# Patient Record
Sex: MALE | Race: WHITE | Employment: FULL TIME | URBAN - METROPOLITAN AREA
[De-identification: names, ages, dates, MRNs, and addresses within clinical notes are randomized per-mention and may not be internally consistent; named-entity substitution may affect disease eponyms.]

---

## 2021-05-03 ENCOUNTER — OFFICE VISIT (OUTPATIENT)
Dept: ENT CLINIC | Age: 26
End: 2021-05-03
Payer: COMMERCIAL

## 2021-05-03 VITALS
DIASTOLIC BLOOD PRESSURE: 81 MMHG | OXYGEN SATURATION: 97 % | SYSTOLIC BLOOD PRESSURE: 115 MMHG | HEART RATE: 75 BPM | WEIGHT: 252 LBS

## 2021-05-03 DIAGNOSIS — H60.332 ACUTE SWIMMER'S EAR OF LEFT SIDE: Primary | ICD-10-CM

## 2021-05-03 DIAGNOSIS — H60.42 CHOLESTEATOMA OF EXTERNAL AUDITORY CANAL, LEFT: ICD-10-CM

## 2021-05-03 DIAGNOSIS — H61.22 IMPACTED CERUMEN OF LEFT EAR: ICD-10-CM

## 2021-05-03 PROCEDURE — 99203 OFFICE O/P NEW LOW 30 MIN: CPT | Performed by: OTOLARYNGOLOGY

## 2021-05-03 RX ORDER — CIPROFLOXACIN AND DEXAMETHASONE 3; 1 MG/ML; MG/ML
4 SUSPENSION/ DROPS AURICULAR (OTIC) 2 TIMES DAILY
Qty: 1 BOTTLE | Refills: 0 | Status: SHIPPED | OUTPATIENT
Start: 2021-05-03

## 2021-05-03 ASSESSMENT — ENCOUNTER SYMPTOMS
RHINORRHEA: 0
SORE THROAT: 0
SINUS PAIN: 0

## 2021-05-03 NOTE — PATIENT INSTRUCTIONS
NO Q-TIPS OR OTHER INSTRUMENTS/OBJECTS IN THE EARS   You should never clean your ears with a Q-tip, cotton tipped applicator, Parvin pin, paper clip, pen cap, nail file, Bull head screwdriver, or any other instrument. This will tend to push wax in deeper and pack the ear canal with wax. There is a high risk and danger of this practice, especially rupture of ear drum, dislocation or other damage to ossicles, and permanent, irreversible, and irreparable hearing loss and/or dizziness. I recommend only use of one the several ear wax removal kits available \"over the counter\" if you feel a need to try to remove ear wax. For example, Murine, Bausch and Lomb, NeilMed, or Debrox ear wax removal kits may be used for ear wax removal, as needed. No other methods should be self used for cleaning your ears.
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
## 2021-06-03 ENCOUNTER — OFFICE VISIT (OUTPATIENT)
Dept: ENT CLINIC | Age: 26
End: 2021-06-03
Payer: COMMERCIAL

## 2021-06-03 ENCOUNTER — PROCEDURE VISIT (OUTPATIENT)
Dept: AUDIOLOGY | Age: 26
End: 2021-06-03
Payer: COMMERCIAL

## 2021-06-03 VITALS
DIASTOLIC BLOOD PRESSURE: 79 MMHG | SYSTOLIC BLOOD PRESSURE: 126 MMHG | HEART RATE: 75 BPM | WEIGHT: 247 LBS | TEMPERATURE: 98.5 F

## 2021-06-03 DIAGNOSIS — H71.22 CHOLESTEATOMA OF LEFT MIDDLE EAR AND MASTOID: Primary | ICD-10-CM

## 2021-06-03 DIAGNOSIS — H90.72 MIXED CONDUCTIVE AND SENSORINEURAL HEARING LOSS OF LEFT EAR WITH UNRESTRICTED HEARING OF RIGHT EAR: Primary | ICD-10-CM

## 2021-06-03 DIAGNOSIS — H91.92 HEARING LOSS OF LEFT EAR, UNSPECIFIED HEARING LOSS TYPE: ICD-10-CM

## 2021-06-03 PROCEDURE — 92567 TYMPANOMETRY: CPT | Performed by: AUDIOLOGIST

## 2021-06-03 PROCEDURE — 99213 OFFICE O/P EST LOW 20 MIN: CPT | Performed by: OTOLARYNGOLOGY

## 2021-06-03 PROCEDURE — 92557 COMPREHENSIVE HEARING TEST: CPT | Performed by: AUDIOLOGIST

## 2021-06-03 ASSESSMENT — ENCOUNTER SYMPTOMS
RHINORRHEA: 0
SORE THROAT: 0

## 2021-06-03 NOTE — PROGRESS NOTES
[facility]    6/3/2021     PCP: No primary care provider on file. MRN: <[id number]>     AUDIOLOGIC AND OTHER PERTINENT MEDICAL HISTORY:        Helen Lopez was seen for hearing and middle ear evaluation. Otolaryngology is referral source of today's appointment. Patient presents with decreased left ear hearing ability. AUDIOGRAM/IMMITTANCE (MIDDLE EAR FUNCTION):      Audiogram demonstrates normal hearing of right ear. Moderate conductive loss that rises to mild degree, of left ear. Loss is asymmetrical, greater on left. Speech discrimination ability was excellent in quiet, at elevated levels. Immittance consistent with normal middle ear function (Type A curve) of left ear, mild negative middle ear pressure (Type C curve) of right. Ipsilateral acoustic reflexes present from right ear,  absent from left. Chart cc'd to: Carlos Manuel Francis MD    COUNSELING:          Audiogram results were reviewed with patient.             RECOMMENDATIONS:         ENT to medically advise    Electronically signed by Derek Crisostomo on 6/3/21 at 11:55 AM.
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
## 2021-06-03 NOTE — PATIENT INSTRUCTIONS
Continue ear drops for 7 days. WATER PRECAUTIONS  · Do not allow water to get into your left ear, as this may cause, or worsen, an ear infection. · Before bathing, showering or washing your hair, a plug of cotton coated with petroleum jelly should be placed in the opening of your ear canal.  After bathing the cotton should be removed and the outer part of your ear dried with a soft towel. Alternatively, you may use a silicone putty ear plug purchased from your pharmacy. · If water does enter your ear, drain the water out into a tissue or cotton ball. Call the office if you develop develops pain or drainage.

## 2021-06-13 ENCOUNTER — HOSPITAL ENCOUNTER (OUTPATIENT)
Dept: CT IMAGING | Age: 26
Discharge: HOME | End: 2021-06-13
Payer: COMMERCIAL

## 2021-06-13 DIAGNOSIS — H71.22 CHOLESTEATOMA OF LEFT MIDDLE EAR AND MASTOID: ICD-10-CM

## 2021-06-13 PROCEDURE — 70480 CT ORBIT/EAR/FOSSA W/O DYE: CPT

## 2021-06-28 ENCOUNTER — OFFICE VISIT (OUTPATIENT)
Dept: ENT CLINIC | Age: 26
End: 2021-06-28
Payer: COMMERCIAL

## 2021-06-28 VITALS — SYSTOLIC BLOOD PRESSURE: 121 MMHG | DIASTOLIC BLOOD PRESSURE: 78 MMHG | HEART RATE: 73 BPM

## 2021-06-28 DIAGNOSIS — H71.22 CHOLESTEATOMA OF LEFT MIDDLE EAR AND MASTOID: Primary | ICD-10-CM

## 2021-06-28 DIAGNOSIS — H90.12 CONDUCTIVE HEARING LOSS OF LEFT EAR WITH UNRESTRICTED HEARING OF RIGHT EAR: ICD-10-CM

## 2021-06-28 PROCEDURE — 99214 OFFICE O/P EST MOD 30 MIN: CPT | Performed by: OTOLARYNGOLOGY

## 2021-06-28 NOTE — PROGRESS NOTES
[facility]       PCP:  Sherry Melo MD      CHIEF COMPLAINT  Chief Complaint   Patient presents with    Ear Problem     colesteatoma left ear. HISTORY OF PRESENT ILLNESS       Kristin Gamble is a 27 y.o. male here for recheck and follow up of left middle ear and mastoid cholesteatoma. REVIEW OF SYSTEMS   Review of Systems   Constitutional: Negative for chills and fever. HENT: Negative for ear discharge, ear pain, rhinorrhea, sinus pain and sore throat. PAST MEDICAL HISTORY    No past medical history on file. No past surgical history on file. EXAMINATION      Vitals:    06/28/21 0907   BP: 121/78   Site: Right Upper Arm   Position: Sitting   Cuff Size: Medium Adult   Pulse: 73         Physical Exam  Vitals reviewed. Constitutional:       General: He is not in acute distress. Appearance: Normal appearance. He is not ill-appearing. HENT:      Head: Normocephalic. Ears:      Comments: Bilateral otomicroscopy performed. There was a posterior superior quadrant perforation filled with squamous debris consistent with cholesteatoma in the posterior superior mesotympanum and extending toward the mastoid. Neurological:      Mental Status: He is alert. REVIEW OF IMAGES         I independently reviewed the images of the CT scan of the IACs. Narrative   EXAMINATION:   CT OF THE INTERNAL AUDITORY CANAL WITHOUT CONTRAST 6/13/2021 3:03 pm:     FINDINGS:   RIGHT TEMPORAL BONE:  The external auditory canal is clear without evidence   of bony erosion.  The scutum is intact.  The middle ear cavity is clear.  The   ossicular chain is intact.  The mastoid air cells are clear.  The inner ear   structures appear unremarkable.  Normal mineralization of the otic capsule.    The internal auditory canal and vestibular aqueduct appear unremarkable.  The   carotid canal is normal in appearance.  The jugular bulb is unremarkable.       LEFT TEMPORAL BONE: The external auditory canal is clear without evidence of   bony erosion.  Moderate opacification of the left mastoid air cells and left   middle ear cavity is associated with osseous erosion of the scrotum and part   of incus. Findings are consistent with left-sided otomastoiditis with   superimposed cholesteatoma.  The inner ear structures appear unremarkable. Normal mineralization of the otic capsule.  The internal auditory canal and   vestibular aqueduct appear unremarkable.  The carotid canal is normal in   appearance.  The jugular bulb is unremarkable.       BRAIN: The visualized portion of the intracranial contents appear   unremarkable.       ORBITS: The visualized portion of the orbits demonstrate no acute abnormality.       SINUSES: Mild mucosal thickening of the ethmoidal air cells and right   sphenoid sinus and maxillary sinuses. The visualized paranasal sinuses are   otherwise clear.           Impression   Moderate opacification of the left mastoid air cells and left middle ear   cavity is associated with osseous erosion of the scrotum and part of incus. Findings are consistent with left-sided otomastoiditis with superimposed   cholesteatoma.       Unremarkable right temporal bone study.       Mild mucosal thickening of the ethmoidal air cells, right sphenoid sinus and   maxillary sinuses.             AUDIOGRAM (6/03/2021):  Left moderate unilateral pan frequency conductive hearing loss. COUNSELING FOR TYMPANOPLASTY WITH MASTOIDECTOMY, POSSIBLE OSSICULAR RECONSTRUCTION:  Shobha Rubio was advised of the recommended surgery/procedure of tympanoplasty with mastoidectomy, AND possible ossicular reconstruction. Shobha Rubio stated that he wants to proceed with surgery. Shobha Rubio stated understanding that this surgery will be done under general anesthesia. The surgical procedure was described.   I discussed the incision and resultant scar and possible resultant deformity. I discussed the surgical technique and the usual post operative course. I discussed the possibility of persistent or recurrent cholesteatoma, tympanic membrane perforation and/or hearing loss, which could be worse after surgery and removal of cholesteatoma. I advised there is no guarantee of cure, success, or of final results. I discussed the alternatives of therapy, expected outcome and potential benefits, and the attendant risks and potential complications, including, but not limited to, failure of the eardrum to heal or the perforation to close with resultant permanent perforation, persistent hearing loss, persistent or recurrent cholesteatoma, excessive intraoperative or postoperative bleeding, hemorrhage, infection, facial paralysis, injury to surrounding structures or organs, injury to major blood vessels or nerves, excessive scarring, deformity, poor (incomplete or lack of) wound healing, pain, discomfort, loss of taste on the anterior two thirds of tongue, persistent hearing loss, total loss of hearing, chronic or permanent dizziness and balance problems, worsening of hearing, numbness of ear and surrounding skin, need for further surgery, and risks of anesthesia, including heart attack, cardiac arrest, stroke, blood clots in lower extremity veins, pulmonary embolism, and death, and other risks listed on the informed consent document, which we discussed together. I discussed cholesteatoma, natural history of the disease, signs, symptoms, possible progression with further loss of hearing, dizziness, facial nerve paralysis and the destructive nature with bone erosion and possible intracranial extension disease. I discussed the surgical treatment of cholesteatoma and need for removal and/or exteriorization of the disease. All of Roge's questions were answered.   Jesus Gibson then stated and confirmed understanding and acceptance of the preceding counseling, the lack of further questions, and the desire to proceed with surgery. Then, Rochelle Penny read and signed the informed consent document. Main Poon / Maria Victoria Mason / Cecile Rivers was seen today for ear problem. Diagnoses and all orders for this visit:    Cholesteatoma of left middle ear and mastoid    Conductive hearing loss of left ear with unrestricted hearing of right ear         RECOMMENDATIONS/PLAN      1. Left tympanoplasty with mastoidectomy, possible ossicular resconstruction. 2. Return for post op check. MEDICAL DECISION MAKING  # and complexity of problems addressed:  66960 - Moderate  1 or more chronic illness with exacerbation, progression or side effect of tx (chronic suppurative otitis media with cholesteatoma with progression)     Amount and/or Complexity of Data to be Reviewed and Analyzed  45074 - Straightforward  no data or only 1 test or document reviewed or ordered    Risk of Complications and /or Morbidity or Mortality of Patient Management   05771 - High  Decision regarding elective major surgery with identified patient or procedure risk factors.

## 2021-07-02 ENCOUNTER — TELEPHONE (OUTPATIENT)
Dept: ENT CLINIC | Age: 26
End: 2021-07-02

## 2021-07-02 NOTE — TELEPHONE ENCOUNTER
Patient returned call and I informed him that the estimate that was ran for him would be $6459.84. Patient stated he is going for a second opinion and would like to cancel his surgery as of now.

## 2021-07-02 NOTE — TELEPHONE ENCOUNTER
Called patient to inform of price of surgery. No answer. Left message for patient to call office back to discuss it.

## 2021-07-03 ASSESSMENT — ENCOUNTER SYMPTOMS
SORE THROAT: 0
RHINORRHEA: 0
SINUS PAIN: 0

## 2024-03-11 ENCOUNTER — OFFICE VISIT (OUTPATIENT)
Dept: PRIMARY CARE CLINIC | Age: 29
End: 2024-03-11
Payer: COMMERCIAL

## 2024-03-11 VITALS
WEIGHT: 242 LBS | SYSTOLIC BLOOD PRESSURE: 104 MMHG | BODY MASS INDEX: 32.78 KG/M2 | HEIGHT: 72 IN | TEMPERATURE: 97.3 F | OXYGEN SATURATION: 97 % | DIASTOLIC BLOOD PRESSURE: 68 MMHG | HEART RATE: 52 BPM | RESPIRATION RATE: 14 BRPM

## 2024-03-11 DIAGNOSIS — R73.09 IMPAIRED GLUCOSE REGULATION: ICD-10-CM

## 2024-03-11 DIAGNOSIS — Z00.00 ENCOUNTER FOR WELL ADULT EXAM WITHOUT ABNORMAL FINDINGS: ICD-10-CM

## 2024-03-11 DIAGNOSIS — R79.0 LOW MAGNESIUM LEVEL: ICD-10-CM

## 2024-03-11 DIAGNOSIS — Z11.59 ENCOUNTER FOR HEPATITIS C SCREENING TEST FOR LOW RISK PATIENT: ICD-10-CM

## 2024-03-11 DIAGNOSIS — Z13.220 SCREENING CHOLESTEROL LEVEL: ICD-10-CM

## 2024-03-11 DIAGNOSIS — K90.41 GLUTEN-SENSITIVE ENTEROPATHY: ICD-10-CM

## 2024-03-11 DIAGNOSIS — R53.83 OTHER FATIGUE: ICD-10-CM

## 2024-03-11 DIAGNOSIS — Z23 NEED FOR VARICELLA VACCINE: ICD-10-CM

## 2024-03-11 DIAGNOSIS — Z11.4 ENCOUNTER FOR SCREENING FOR HIV: ICD-10-CM

## 2024-03-11 DIAGNOSIS — Z00.00 ENCOUNTER FOR WELL ADULT EXAM WITHOUT ABNORMAL FINDINGS: Primary | ICD-10-CM

## 2024-03-11 DIAGNOSIS — R10.13 EPIGASTRIC DISCOMFORT: ICD-10-CM

## 2024-03-11 DIAGNOSIS — E55.9 VITAMIN D DEFICIENCY: ICD-10-CM

## 2024-03-11 DIAGNOSIS — F41.1 GENERALIZED ANXIETY DISORDER: ICD-10-CM

## 2024-03-11 DIAGNOSIS — Z71.89 ACP (ADVANCE CARE PLANNING): ICD-10-CM

## 2024-03-11 LAB
BASOPHILS # BLD: 0 K/UL (ref 0–0.2)
BASOPHILS NFR BLD: 0.5 %
DEPRECATED RDW RBC AUTO: 12.9 % (ref 12.4–15.4)
EOSINOPHIL # BLD: 0.1 K/UL (ref 0–0.6)
EOSINOPHIL NFR BLD: 1.1 %
HCT VFR BLD AUTO: 45.3 % (ref 40.5–52.5)
HGB BLD-MCNC: 16.1 G/DL (ref 13.5–17.5)
LYMPHOCYTES # BLD: 2.2 K/UL (ref 1–5.1)
LYMPHOCYTES NFR BLD: 28 %
MCH RBC QN AUTO: 30.4 PG (ref 26–34)
MCHC RBC AUTO-ENTMCNC: 35.4 G/DL (ref 31–36)
MCV RBC AUTO: 85.9 FL (ref 80–100)
MONOCYTES # BLD: 0.4 K/UL (ref 0–1.3)
MONOCYTES NFR BLD: 4.8 %
NEUTROPHILS # BLD: 5.2 K/UL (ref 1.7–7.7)
NEUTROPHILS NFR BLD: 65.6 %
PLATELET # BLD AUTO: 326 K/UL (ref 135–450)
PMV BLD AUTO: 8 FL (ref 5–10.5)
RBC # BLD AUTO: 5.28 M/UL (ref 4.2–5.9)
WBC # BLD AUTO: 8 K/UL (ref 4–11)

## 2024-03-11 PROCEDURE — 99385 PREV VISIT NEW AGE 18-39: CPT | Performed by: INTERNAL MEDICINE

## 2024-03-11 SDOH — ECONOMIC STABILITY: FOOD INSECURITY: WITHIN THE PAST 12 MONTHS, THE FOOD YOU BOUGHT JUST DIDN'T LAST AND YOU DIDN'T HAVE MONEY TO GET MORE.: NEVER TRUE

## 2024-03-11 SDOH — ECONOMIC STABILITY: INCOME INSECURITY: HOW HARD IS IT FOR YOU TO PAY FOR THE VERY BASICS LIKE FOOD, HOUSING, MEDICAL CARE, AND HEATING?: NOT HARD AT ALL

## 2024-03-11 SDOH — ECONOMIC STABILITY: FOOD INSECURITY: WITHIN THE PAST 12 MONTHS, YOU WORRIED THAT YOUR FOOD WOULD RUN OUT BEFORE YOU GOT MONEY TO BUY MORE.: NEVER TRUE

## 2024-03-11 SDOH — ECONOMIC STABILITY: HOUSING INSECURITY
IN THE LAST 12 MONTHS, WAS THERE A TIME WHEN YOU DID NOT HAVE A STEADY PLACE TO SLEEP OR SLEPT IN A SHELTER (INCLUDING NOW)?: NO

## 2024-03-11 ASSESSMENT — PATIENT HEALTH QUESTIONNAIRE - PHQ9
SUM OF ALL RESPONSES TO PHQ QUESTIONS 1-9: 2
2. FEELING DOWN, DEPRESSED OR HOPELESS: 1
1. LITTLE INTEREST OR PLEASURE IN DOING THINGS: 1
SUM OF ALL RESPONSES TO PHQ QUESTIONS 1-9: 2
SUM OF ALL RESPONSES TO PHQ9 QUESTIONS 1 & 2: 2
SUM OF ALL RESPONSES TO PHQ QUESTIONS 1-9: 2
SUM OF ALL RESPONSES TO PHQ QUESTIONS 1-9: 2

## 2024-03-11 ASSESSMENT — ENCOUNTER SYMPTOMS
NAUSEA: 0
VOMITING: 0
BLOOD IN STOOL: 0
COLOR CHANGE: 0
TROUBLE SWALLOWING: 0
SINUS PRESSURE: 0
SHORTNESS OF BREATH: 0
SORE THROAT: 0
WHEEZING: 0
ABDOMINAL PAIN: 1
CONSTIPATION: 0
DIARRHEA: 0
EYE REDNESS: 0
ABDOMINAL DISTENTION: 0
EYE PAIN: 0
CHEST TIGHTNESS: 0
BACK PAIN: 0
COUGH: 0

## 2024-03-11 NOTE — PATIENT INSTRUCTIONS
following the DASH diet can lower blood pressure even further than just the DASH diet alone.  Follow-up care is a key part of your treatment and safety. Be sure to make and go to all appointments, and call your doctor if you are having problems. It’s also a good idea to know your test results and keep a list of the medicines you take.  How can you care for yourself at home?  Following the DASH diet  Eat 4 to 5 servings of fruit each day. A serving is 1 medium-sized piece of fruit, ½ cup chopped or canned fruit, 1/4 cup dried fruit, or 4 ounces (½ cup) of fruit juice. Choose fruit more often than fruit juice.  Eat 4 to 5 servings of vegetables each day. A serving is 1 cup of lettuce or raw leafy vegetables, ½ cup of chopped or cooked vegetables, or 4 ounces (½ cup) of vegetable juice. Choose vegetables more often than vegetable juice.  Get 2 to 3 servings of low-fat and fat-free dairy each day. A serving is 8 ounces of milk, 1 cup of yogurt, or 1 ½ ounces of cheese.  Eat 7 to 8 servings of grains each day. A serving is 1 slice of bread, 1 ounce of dry cereal, or ½ cup of cooked rice, pasta, or cooked cereal. Try to choose whole-grain products as much as possible.  Limit lean meat, poultry, and fish to 6 ounces each day. Six ounces is about the size of two decks of cards.  Eat 4 to 5 servings of nuts, seeds, and legumes (cooked dried beans, lentils, and split peas) each week. A serving is 1/3 cup of nuts, 2 tablespoons of seeds, or ½ cup cooked dried beans or peas.  Limit sweets and added sugars to 5 servings or less a week. A serving is 1 tablespoon jelly or jam, ½ cup sorbet, or 1 cup of lemonade.  Tips for success  Start small. Do not try to make dramatic changes to your diet all at once. You might feel that you are missing out on your favorite foods and then be more likely to not follow the plan. Make small changes, and stick with them. Once those changes become habit, add a few more changes.  Try some of the

## 2024-03-11 NOTE — ASSESSMENT & PLAN NOTE
Patient could not tolerate SSRIs.  We discussed Wellbutrin versus BuSpar plus as needed hydroxyzine  Patient would like to discuss alternative to medication.  Patient given following information to be seen at walk-in behavioral health facilities.  [facility]  [address], [place] [address]  [address] [place] [address] [address] , [place] [address]  Phone [phone] or [phone]    OR    [facility]  [address] [address]

## 2024-03-11 NOTE — PROGRESS NOTES
Never done    Depression Screen  Never done    HIV screen  Never done    Hepatitis C screen  Never done    DTaP/Tdap/Td vaccine (1 - Tdap) Never done    Flu vaccine (1) Never done    Hepatitis A vaccine  Aged Out    Hib vaccine  Aged Out    HPV vaccine  Aged Out    Polio vaccine  Aged Out    Meningococcal (ACWY) vaccine  Aged Out    Pneumococcal 0-64 years Vaccine  Aged Out     Recommendations for Preventive Services Due: see orders and patient instructions/AVS.    Return in about 3 months (around 6/11/2024) for Routine follow-up, 15-minutes .        Advance Care Planning   Discussed the patient’s choices for care and treatment preferences in case of a health event that adversely affects decision-making abilities or is life-limiting. Recommended the patient document care preferences in state-specific advance directives. Also reviewed the process of designating a trusted capable adult as an Agent (or Health Care Power of ) to make health care decisions for the patient if the patient becomes unable due to incapacity. Patient was asked to complete advance directive forms, if not already done, and to provide a dated, signed and witnessed (or notarized) copy, per the forms' requirements, to the practice office.

## 2024-03-11 NOTE — ASSESSMENT & PLAN NOTE
Patient admits to symptoms including diarrhea getting worse after he eats gluten food rich like past.  Also has symptoms of dyspepsia.  Will get lab work.

## 2024-03-12 DIAGNOSIS — E55.9 VITAMIN D DEFICIENCY: ICD-10-CM

## 2024-03-12 DIAGNOSIS — R74.01 ELEVATED ALT MEASUREMENT: Primary | ICD-10-CM

## 2024-03-12 LAB
25(OH)D3 SERPL-MCNC: 20.4 NG/ML
ALBUMIN SERPL-MCNC: 5 G/DL (ref 3.4–5)
ALBUMIN/GLOB SERPL: 2.1 {RATIO} (ref 1.1–2.2)
ALP SERPL-CCNC: 81 U/L (ref 40–129)
ALT SERPL-CCNC: 72 U/L (ref 10–40)
ANION GAP SERPL CALCULATED.3IONS-SCNC: 13 MMOL/L (ref 3–16)
AST SERPL-CCNC: 33 U/L (ref 15–37)
BILIRUB SERPL-MCNC: 0.5 MG/DL (ref 0–1)
BUN SERPL-MCNC: 12 MG/DL (ref 7–20)
CALCIUM SERPL-MCNC: 9.5 MG/DL (ref 8.3–10.6)
CHLORIDE SERPL-SCNC: 105 MMOL/L (ref 99–110)
CHOLEST SERPL-MCNC: 141 MG/DL (ref 0–199)
CO2 SERPL-SCNC: 22 MMOL/L (ref 21–32)
CREAT SERPL-MCNC: 1 MG/DL (ref 0.9–1.3)
EST. AVERAGE GLUCOSE BLD GHB EST-MCNC: 91.1 MG/DL
GFR SERPLBLD CREATININE-BSD FMLA CKD-EPI: >60 ML/MIN/{1.73_M2}
GLUCOSE SERPL-MCNC: 88 MG/DL (ref 70–99)
HBA1C MFR BLD: 4.8 %
HCV AB S/CO SERPL IA: 0.04 IV
HCV AB SERPL QL IA: NEGATIVE
HDLC SERPL-MCNC: 45 MG/DL (ref 40–60)
HIV 1+2 AB+HIV1 P24 AG SERPL QL IA: NORMAL
HIV 2 AB SERPL QL IA: NORMAL
HIV1 AB SERPL QL IA: NORMAL
HIV1 P24 AG SERPL QL IA: NORMAL
IGA SERPL-MCNC: 228 MG/DL (ref 70–400)
LDLC SERPL CALC-MCNC: 84 MG/DL
MAGNESIUM SERPL-MCNC: 2.2 MG/DL (ref 1.8–2.4)
POTASSIUM SERPL-SCNC: 4.8 MMOL/L (ref 3.5–5.1)
PROT SERPL-MCNC: 7.4 G/DL (ref 6.4–8.2)
SODIUM SERPL-SCNC: 140 MMOL/L (ref 136–145)
TISSUE TRANSGLUTAMINASE IGA: <0.5 U/ML (ref 0–14)
TRIGL SERPL-MCNC: 60 MG/DL (ref 0–150)
TSH SERPL DL<=0.005 MIU/L-ACNC: 0.69 UIU/ML (ref 0.27–4.2)
VIT B12 SERPL-MCNC: 658 PG/ML (ref 211–911)
VLDLC SERPL CALC-MCNC: 12 MG/DL

## 2024-03-12 NOTE — RESULT ENCOUNTER NOTE
Please let patient know that his liver ALT was a bit elevated.  Given his history of upper abdominal symptoms of ordered a liver ultrasound to assess this.  Please provide patient telephone number to schedule the study.  His vitamin D was elevated sent in a prescription of vitamin D to his pharmacy for him.    Patient has 2 results pending if he does not hear from us that means it was negative.

## 2024-03-13 LAB — VZV IGG SER QL IA: NORMAL

## 2024-03-14 ENCOUNTER — HOSPITAL ENCOUNTER (OUTPATIENT)
Dept: ULTRASOUND IMAGING | Age: 29
Discharge: HOME | End: 2024-03-14
Attending: INTERNAL MEDICINE
Payer: COMMERCIAL

## 2024-03-14 DIAGNOSIS — R74.01 ELEVATED ALT MEASUREMENT: ICD-10-CM

## 2024-03-14 DIAGNOSIS — Z00.00 ENCOUNTER FOR WELL ADULT EXAM WITHOUT ABNORMAL FINDINGS: ICD-10-CM

## 2024-03-14 DIAGNOSIS — R10.13 EPIGASTRIC DISCOMFORT: ICD-10-CM

## 2024-03-14 PROCEDURE — 76705 ECHO EXAM OF ABDOMEN: CPT

## 2024-03-15 DIAGNOSIS — K76.0 FATTY LIVER: Primary | ICD-10-CM

## 2024-03-15 RX ORDER — VITAMIN E 268 MG
400 CAPSULE ORAL NIGHTLY
Qty: 90 CAPSULE | Refills: 3 | Status: SHIPPED | OUTPATIENT
Start: 2024-03-15

## 2024-03-15 RX ORDER — OMEGA-3 FATTY ACIDS/FISH OIL 300-1000MG
1000 CAPSULE ORAL 2 TIMES DAILY WITH MEALS
Qty: 180 CAPSULE | Refills: 3 | Status: SHIPPED | OUTPATIENT
Start: 2024-03-15

## 2024-03-16 LAB — H PYLORI AG STL QL IA: NEGATIVE

## 2024-03-18 ENCOUNTER — TELEPHONE (OUTPATIENT)
Dept: ADMINISTRATIVE | Age: 29
End: 2024-03-18

## 2024-03-18 NOTE — TELEPHONE ENCOUNTER
Submitted PA for Omega-3-acid Ethyl Esters 1GM capsules   Via CM Key: [id number]  STATUS: No coverage was found. Please reconfirm the patient's plan before submitting. You may also return to the dashboard and select a Medicare form if the patient has Medicare Part D coverage.     Called plan and spoke with Lacey. She reports that non formulary requests have to be completed by fax. She is faxing the questionnaire. Formulary alternatives include Pravastatin, Rosuvastatin, Atorvastatin, Fenofibrate, Simvastatin, Gemfibrozil, Lovastatin.       Follow up done daily; if no decision with in three days we will refax.  If another three days goes by with no decision will call the insurance for status.

## 2024-03-19 NOTE — TELEPHONE ENCOUNTER
Questionnaire received. Sent with clinical notes, labs and radiology report by fax. Uploaded questionnaire into media.

## 2024-04-10 PROBLEM — Z00.00 ENCOUNTER FOR WELL ADULT EXAM WITHOUT ABNORMAL FINDINGS: Status: RESOLVED | Noted: 2024-03-11 | Resolved: 2024-04-10
